# Patient Record
Sex: MALE | Race: WHITE | NOT HISPANIC OR LATINO | Employment: FULL TIME | URBAN - METROPOLITAN AREA
[De-identification: names, ages, dates, MRNs, and addresses within clinical notes are randomized per-mention and may not be internally consistent; named-entity substitution may affect disease eponyms.]

---

## 2024-03-15 ENCOUNTER — OFFICE VISIT (OUTPATIENT)
Dept: PRIMARY CARE | Facility: CLINIC | Age: 39
End: 2024-03-15
Payer: COMMERCIAL

## 2024-03-15 VITALS
SYSTOLIC BLOOD PRESSURE: 112 MMHG | RESPIRATION RATE: 16 BRPM | BODY MASS INDEX: 33.32 KG/M2 | DIASTOLIC BLOOD PRESSURE: 70 MMHG | WEIGHT: 238 LBS | HEIGHT: 71 IN | HEART RATE: 66 BPM | OXYGEN SATURATION: 96 % | TEMPERATURE: 97 F

## 2024-03-15 DIAGNOSIS — E66.9 OBESITY, CLASS I, BMI 30-34.9: ICD-10-CM

## 2024-03-15 DIAGNOSIS — E11.9 CONTROLLED TYPE 2 DIABETES MELLITUS WITHOUT COMPLICATION, WITHOUT LONG-TERM CURRENT USE OF INSULIN (MULTI): ICD-10-CM

## 2024-03-15 DIAGNOSIS — Z00.00 ROUTINE HEALTH MAINTENANCE: Primary | ICD-10-CM

## 2024-03-15 DIAGNOSIS — R40.0 DAYTIME SOMNOLENCE: ICD-10-CM

## 2024-03-15 DIAGNOSIS — R29.818 SUSPECTED SLEEP APNEA: ICD-10-CM

## 2024-03-15 DIAGNOSIS — F43.9 STRESS: ICD-10-CM

## 2024-03-15 DIAGNOSIS — E78.2 HYPERLIPIDEMIA, MIXED: ICD-10-CM

## 2024-03-15 DIAGNOSIS — R80.9 MICROALBUMINURIA: ICD-10-CM

## 2024-03-15 DIAGNOSIS — N52.9 ERECTILE DYSFUNCTION, UNSPECIFIED ERECTILE DYSFUNCTION TYPE: ICD-10-CM

## 2024-03-15 PROBLEM — R06.89 GASPING FOR BREATH: Status: RESOLVED | Noted: 2024-03-15 | Resolved: 2024-03-15

## 2024-03-15 PROBLEM — G89.29 CHRONIC PAIN OF RIGHT KNEE: Status: ACTIVE | Noted: 2024-03-15

## 2024-03-15 PROBLEM — M25.561 CHRONIC PAIN OF RIGHT KNEE: Status: ACTIVE | Noted: 2024-03-15

## 2024-03-15 PROBLEM — R06.89 GASPING FOR BREATH: Status: ACTIVE | Noted: 2024-03-15

## 2024-03-15 PROBLEM — S09.90XS HEAD TRAUMA, SEQUELA: Status: ACTIVE | Noted: 2020-01-17

## 2024-03-15 LAB — POC HEMOGLOBIN A1C: 5.3 % (ref 4.2–6.5)

## 2024-03-15 PROCEDURE — 99214 OFFICE O/P EST MOD 30 MIN: CPT | Performed by: STUDENT IN AN ORGANIZED HEALTH CARE EDUCATION/TRAINING PROGRAM

## 2024-03-15 PROCEDURE — 3078F DIAST BP <80 MM HG: CPT | Performed by: STUDENT IN AN ORGANIZED HEALTH CARE EDUCATION/TRAINING PROGRAM

## 2024-03-15 PROCEDURE — 83036 HEMOGLOBIN GLYCOSYLATED A1C: CPT | Performed by: STUDENT IN AN ORGANIZED HEALTH CARE EDUCATION/TRAINING PROGRAM

## 2024-03-15 PROCEDURE — 1036F TOBACCO NON-USER: CPT | Performed by: STUDENT IN AN ORGANIZED HEALTH CARE EDUCATION/TRAINING PROGRAM

## 2024-03-15 PROCEDURE — 3074F SYST BP LT 130 MM HG: CPT | Performed by: STUDENT IN AN ORGANIZED HEALTH CARE EDUCATION/TRAINING PROGRAM

## 2024-03-15 RX ORDER — SILDENAFIL CITRATE 20 MG/1
20-40 TABLET ORAL DAILY PRN
COMMUNITY

## 2024-03-15 ASSESSMENT — PATIENT HEALTH QUESTIONNAIRE - PHQ9
1. LITTLE INTEREST OR PLEASURE IN DOING THINGS: NOT AT ALL
SUM OF ALL RESPONSES TO PHQ9 QUESTIONS 1 & 2: 2
2. FEELING DOWN, DEPRESSED OR HOPELESS: MORE THAN HALF THE DAYS

## 2024-03-15 NOTE — PROGRESS NOTES
"Subjective   Patient ID: Gideon Armas is a 38 y.o. male who presents for Establish Care and Diabetes.  HPI    He is seen today to establish care.     Would like to follow up on diabetes. Has improved diet with vegan diet, plant based Mediterranean diet. Checks blood sugars weekly and have been in 101-115 in AM.     Still getting AM erections. Has been using sildenafil which helps ED but hasn't tried 40mg yet.     Some fatigue during day but trouble sleeping at night due to increased stress. Wife notices snoring.     Reports history of anxiety.           Review of Systems   Constitutional:  Negative for chills, diaphoresis and fever.   Respiratory:  Negative for shortness of breath.    Cardiovascular:  Negative for chest pain.   Gastrointestinal:  Negative for diarrhea, nausea and vomiting.   Endocrine: Negative for cold intolerance and heat intolerance.   Genitourinary:  Negative for dysuria.   Neurological:  Negative for dizziness and light-headedness.       /70   Pulse 66   Temp 36.1 °C (97 °F) (Temporal)   Resp 16   Ht 1.803 m (5' 11\")   Wt 108 kg (238 lb)   SpO2 96%   BMI 33.19 kg/m²     Objective   Physical Exam  Vitals reviewed.   Constitutional:       General: He is not in acute distress.     Appearance: Normal appearance.   HENT:      Head: Normocephalic.   Cardiovascular:      Rate and Rhythm: Normal rate and regular rhythm.   Pulmonary:      Effort: Pulmonary effort is normal. No respiratory distress.      Breath sounds: Normal breath sounds.   Abdominal:      General: There is no distension.   Musculoskeletal:         General: No deformity.   Skin:     Coloration: Skin is not jaundiced.   Neurological:      Mental Status: He is alert.         Assessment/Plan   Problem List Items Addressed This Visit       Controlled type 2 diabetes mellitus without complication, without long-term current use of insulin (CMS/[facility])    Relevant Orders    Albumin , Urine Random    Referral to Ophthalmology    POCT " glycosylated hemoglobin (Hb A1C) manually resulted (Completed)    Daytime somnolence    Relevant Orders    Home sleep apnea test (HSAT)    Hyperlipidemia, mixed    Microalbuminuria    Obesity, Class I, BMI 30-34.9    Relevant Orders    Home sleep apnea test (HSAT)    Suspected sleep apnea    Relevant Orders    Home sleep apnea test (HSAT)    Erectile dysfunction    Stress    Relevant Orders    Referral to Psychology     Other Visit Diagnoses       Routine health maintenance    -  Primary    Relevant Orders    Lipid Panel    CBC    Comprehensive Metabolic Panel    TSH with reflex to Free T4 if abnormal          Type 2 diabetes mellitus  Microalbuminuria  - Last A1c was 4.8, recheck today was 5.3.   -Spot urine protein abnormal January 2023, due, recheck  -Foot exam at next visit.  - Eye exam-saw last year at [facility], re referred today    Hyperlipidemia  - Last lipids in July 2023 were elevated, recheck    Elevated LFTs  - Normal LFTs January 2023, recheck  -Sleep study done for daytime somnolence    BMI 33  - Diet and exercise    Erectile dysfunction  -Has been prescribed sildenafil 20-40 mg PRN in past and aware how to use it    History of subclinical head trauma from football through college-was concerned about temper at one point. Had tremors in head about 10 years ago and had MRI and was not an issue.     Stress  -Some family issues and job stress. Dad had stroke March 1st and he is helping take care of him and he reports he will also establish care here. Relies on his wife and Aunt to help with stress and support. Aunt lives in [place]. Talking to a counselor.   -Refer to psychology to establish care.     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     Fasting labs ordered  CPE 3 months, F/u sooner PRN

## 2024-03-16 PROBLEM — F43.9 STRESS: Status: ACTIVE | Noted: 2024-03-16

## 2024-03-16 PROBLEM — N52.9 ERECTILE DYSFUNCTION: Status: ACTIVE | Noted: 2024-03-16

## 2024-03-16 ASSESSMENT — ENCOUNTER SYMPTOMS
DIARRHEA: 0
LIGHT-HEADEDNESS: 0
DIZZINESS: 0
SHORTNESS OF BREATH: 0
VOMITING: 0
FEVER: 0
NAUSEA: 0
CHILLS: 0
DIAPHORESIS: 0
DYSURIA: 0

## 2024-04-02 ENCOUNTER — CLINICAL SUPPORT (OUTPATIENT)
Dept: SLEEP MEDICINE | Facility: HOSPITAL | Age: 39
End: 2024-04-02
Payer: COMMERCIAL

## 2024-04-02 DIAGNOSIS — R29.818 SUSPECTED SLEEP APNEA: ICD-10-CM

## 2024-04-02 DIAGNOSIS — R40.0 DAYTIME SOMNOLENCE: ICD-10-CM

## 2024-04-02 DIAGNOSIS — E66.9 OBESITY, CLASS I, BMI 30-34.9: ICD-10-CM

## 2024-04-02 PROCEDURE — 95806 SLEEP STUDY UNATT&RESP EFFT: CPT | Performed by: PSYCHIATRY & NEUROLOGY

## 2024-04-22 DIAGNOSIS — G47.33 MILD OBSTRUCTIVE SLEEP APNEA: Primary | ICD-10-CM

## 2024-06-14 ENCOUNTER — LAB (OUTPATIENT)
Dept: LAB | Facility: LAB | Age: 39
End: 2024-06-14
Payer: COMMERCIAL

## 2024-06-14 ENCOUNTER — APPOINTMENT (OUTPATIENT)
Dept: PRIMARY CARE | Facility: CLINIC | Age: 39
End: 2024-06-14
Payer: COMMERCIAL

## 2024-06-14 VITALS
RESPIRATION RATE: 14 BRPM | TEMPERATURE: 97.7 F | WEIGHT: 237 LBS | HEART RATE: 63 BPM | SYSTOLIC BLOOD PRESSURE: 112 MMHG | DIASTOLIC BLOOD PRESSURE: 78 MMHG | HEIGHT: 71 IN | BODY MASS INDEX: 33.18 KG/M2 | OXYGEN SATURATION: 99 %

## 2024-06-14 DIAGNOSIS — G47.33 MILD OBSTRUCTIVE SLEEP APNEA: ICD-10-CM

## 2024-06-14 DIAGNOSIS — Z00.00 ROUTINE HEALTH MAINTENANCE: ICD-10-CM

## 2024-06-14 DIAGNOSIS — E11.9 CONTROLLED TYPE 2 DIABETES MELLITUS WITHOUT COMPLICATION, WITHOUT LONG-TERM CURRENT USE OF INSULIN (MULTI): ICD-10-CM

## 2024-06-14 DIAGNOSIS — R40.0 DAYTIME SOMNOLENCE: Primary | ICD-10-CM

## 2024-06-14 DIAGNOSIS — F32.A MILD DEPRESSION: ICD-10-CM

## 2024-06-14 DIAGNOSIS — E78.2 HYPERLIPIDEMIA, MIXED: ICD-10-CM

## 2024-06-14 DIAGNOSIS — E66.9 OBESITY, CLASS I, BMI 30-34.9: ICD-10-CM

## 2024-06-14 DIAGNOSIS — R80.9 MICROALBUMINURIA: ICD-10-CM

## 2024-06-14 DIAGNOSIS — N52.9 ERECTILE DYSFUNCTION, UNSPECIFIED ERECTILE DYSFUNCTION TYPE: ICD-10-CM

## 2024-06-14 LAB
ALBUMIN SERPL BCP-MCNC: 4.6 G/DL (ref 3.4–5)
ALP SERPL-CCNC: 67 U/L (ref 33–120)
ALT SERPL W P-5'-P-CCNC: 31 U/L (ref 10–52)
ANION GAP SERPL CALC-SCNC: 13 MMOL/L (ref 10–20)
AST SERPL W P-5'-P-CCNC: 24 U/L (ref 9–39)
BILIRUB SERPL-MCNC: 1.2 MG/DL (ref 0–1.2)
BUN SERPL-MCNC: 7 MG/DL (ref 6–23)
CALCIUM SERPL-MCNC: 9.7 MG/DL (ref 8.6–10.6)
CHLORIDE SERPL-SCNC: 101 MMOL/L (ref 98–107)
CHOLEST SERPL-MCNC: 199 MG/DL (ref 0–199)
CHOLESTEROL/HDL RATIO: 5.3
CO2 SERPL-SCNC: 30 MMOL/L (ref 21–32)
CREAT SERPL-MCNC: 0.94 MG/DL (ref 0.5–1.3)
CREAT UR-MCNC: 104.8 MG/DL (ref 20–370)
EGFRCR SERPLBLD CKD-EPI 2021: >90 ML/MIN/1.73M*2
ERYTHROCYTE [DISTWIDTH] IN BLOOD BY AUTOMATED COUNT: 12.5 % (ref 11.5–14.5)
GLUCOSE SERPL-MCNC: 94 MG/DL (ref 74–99)
HCT VFR BLD AUTO: 45.6 % (ref 41–52)
HDLC SERPL-MCNC: 37.2 MG/DL
HGB BLD-MCNC: 15.9 G/DL (ref 13.5–17.5)
LDLC SERPL CALC-MCNC: 112 MG/DL
MCH RBC QN AUTO: 29.8 PG (ref 26–34)
MCHC RBC AUTO-ENTMCNC: 34.9 G/DL (ref 32–36)
MCV RBC AUTO: 85 FL (ref 80–100)
MICROALBUMIN UR-MCNC: 7.1 MG/L
MICROALBUMIN/CREAT UR: 6.8 UG/MG CREAT
NON HDL CHOLESTEROL: 162 MG/DL (ref 0–149)
NRBC BLD-RTO: 0 /100 WBCS (ref 0–0)
PLATELET # BLD AUTO: 217 X10*3/UL (ref 150–450)
POC HEMOGLOBIN A1C: 5.2 % (ref 4.2–6.5)
POTASSIUM SERPL-SCNC: 3.9 MMOL/L (ref 3.5–5.3)
PROT SERPL-MCNC: 7.2 G/DL (ref 6.4–8.2)
RBC # BLD AUTO: 5.34 X10*6/UL (ref 4.5–5.9)
SODIUM SERPL-SCNC: 140 MMOL/L (ref 136–145)
TRIGL SERPL-MCNC: 251 MG/DL (ref 0–149)
TSH SERPL-ACNC: 1.62 MIU/L (ref 0.44–3.98)
VLDL: 50 MG/DL (ref 0–40)
WBC # BLD AUTO: 5 X10*3/UL (ref 4.4–11.3)

## 2024-06-14 PROCEDURE — 82043 UR ALBUMIN QUANTITATIVE: CPT

## 2024-06-14 PROCEDURE — 82570 ASSAY OF URINE CREATININE: CPT

## 2024-06-14 PROCEDURE — 85027 COMPLETE CBC AUTOMATED: CPT

## 2024-06-14 PROCEDURE — 80061 LIPID PANEL: CPT

## 2024-06-14 PROCEDURE — 36415 COLL VENOUS BLD VENIPUNCTURE: CPT

## 2024-06-14 PROCEDURE — 80053 COMPREHEN METABOLIC PANEL: CPT

## 2024-06-14 PROCEDURE — 84443 ASSAY THYROID STIM HORMONE: CPT

## 2024-06-14 ASSESSMENT — PATIENT HEALTH QUESTIONNAIRE - PHQ9
5. POOR APPETITE OR OVEREATING: SEVERAL DAYS
SUM OF ALL RESPONSES TO PHQ9 QUESTIONS 1 AND 2: 2
4. FEELING TIRED OR HAVING LITTLE ENERGY: NOT AT ALL
SUM OF ALL RESPONSES TO PHQ QUESTIONS 1-9: 8
7. TROUBLE CONCENTRATING ON THINGS, SUCH AS READING THE NEWSPAPER OR WATCHING TELEVISION: SEVERAL DAYS
10. IF YOU CHECKED OFF ANY PROBLEMS, HOW DIFFICULT HAVE THESE PROBLEMS MADE IT FOR YOU TO DO YOUR WORK, TAKE CARE OF THINGS AT HOME, OR GET ALONG WITH OTHER PEOPLE: SOMEWHAT DIFFICULT
9. THOUGHTS THAT YOU WOULD BE BETTER OFF DEAD, OR OF HURTING YOURSELF: NOT AT ALL
1. LITTLE INTEREST OR PLEASURE IN DOING THINGS: SEVERAL DAYS
2. FEELING DOWN, DEPRESSED OR HOPELESS: SEVERAL DAYS
6. FEELING BAD ABOUT YOURSELF - OR THAT YOU ARE A FAILURE OR HAVE LET YOURSELF OR YOUR FAMILY DOWN: MORE THAN HALF THE DAYS
3. TROUBLE FALLING OR STAYING ASLEEP: MORE THAN HALF THE DAYS
8. MOVING OR SPEAKING SO SLOWLY THAT OTHER PEOPLE COULD HAVE NOTICED. OR THE OPPOSITE, BEING SO FIGETY OR RESTLESS THAT YOU HAVE BEEN MOVING AROUND A LOT MORE THAN USUAL: NOT AT ALL

## 2024-06-14 ASSESSMENT — ENCOUNTER SYMPTOMS
MYALGIAS: 0
UNEXPECTED WEIGHT CHANGE: 0
LIGHT-HEADEDNESS: 0
DIAPHORESIS: 0
VOMITING: 0
NAUSEA: 0
DIZZINESS: 0
DIARRHEA: 0
SHORTNESS OF BREATH: 0
FEVER: 0
DYSURIA: 0
CHILLS: 0

## 2024-06-14 NOTE — PROGRESS NOTES
"Subjective   Patient ID: Gideon Armas is a 38 y.o. male who presents for Annual Exam.  HPI    Here for CPE.    Feels diet has been more poor as of recent and some weight gain.    Some stress with helping with his father who had a stroke and living at a nursing facility.    Meeting with his therapist quite regularly.    On completing PHQ-9 reports no thoughts of self-harm in the last 2 weeks though does have intermittent thoughts which are fleeting of self-harm.  Denies any plan to act on these thoughts whatsoever and is motivated to keep living to help his father.  He has told these thoughts to his therapist who helps him navigate these closely.        Review of Systems   Constitutional:  Negative for chills, diaphoresis, fever and unexpected weight change.   HENT:  Negative for hearing loss.    Eyes:  Negative for visual disturbance.   Respiratory:  Negative for shortness of breath.    Cardiovascular:  Negative for chest pain.   Gastrointestinal:  Negative for diarrhea, nausea and vomiting.   Endocrine: Negative for cold intolerance and heat intolerance.   Genitourinary:  Negative for dysuria, scrotal swelling and testicular pain.   Musculoskeletal:  Negative for myalgias.   Skin:  Negative for rash.   Neurological:  Negative for dizziness and light-headedness.       /78   Pulse 63   Temp 36.5 °C (97.7 °F) (Temporal)   Resp 14   Ht 1.803 m (5' 11\")   Wt 108 kg (237 lb)   SpO2 99%   BMI 33.05 kg/m²     Objective   Physical Exam  Vitals reviewed.   Constitutional:       General: He is not in acute distress.     Appearance: Normal appearance.   HENT:      Head: Normocephalic.      Right Ear: Tympanic membrane, ear canal and external ear normal. There is no impacted cerumen.      Left Ear: Tympanic membrane, ear canal and external ear normal. There is no impacted cerumen.      Mouth/Throat:      Mouth: Mucous membranes are moist.      Pharynx: Oropharynx is clear. No oropharyngeal exudate or posterior " oropharyngeal erythema.   Cardiovascular:      Rate and Rhythm: Normal rate and regular rhythm.   Pulmonary:      Effort: Pulmonary effort is normal. No respiratory distress.      Breath sounds: Normal breath sounds.   Abdominal:      General: Bowel sounds are normal. There is no distension.      Palpations: Abdomen is soft. There is no mass.      Tenderness: There is no abdominal tenderness. There is no guarding or rebound.   Musculoskeletal:         General: No deformity.      Cervical back: Neck supple. No tenderness.        Feet:    Feet:      Comments: Intact monofilament testing to areas marked in blue.  Lymphadenopathy:      Cervical: No cervical adenopathy.   Skin:     Coloration: Skin is not jaundiced.   Neurological:      Mental Status: He is alert.         Assessment/Plan   Problem List Items Addressed This Visit       Controlled type 2 diabetes mellitus without complication, without long-term current use of insulin ([facility])    Relevant Orders    POCT glycosylated hemoglobin (Hb A1C) manually resulted (Completed)    Daytime somnolence - Primary    Hyperlipidemia, mixed    Microalbuminuria    Obesity, Class I, BMI 30-34.9    Erectile dysfunction    Mild depression    Mild obstructive sleep apnea   Type 2 diabetes mellitus  Microalbuminuria  - Last A1c was 4.8, recheck today was 5.3.   -Spot urine protein abnormal January 2023, due, recheck  -Foot exam normal 6/14/24  - Eye exam-saw last year at [facility], has appt scheduled.   -Recheck urine for microalbuminuria.  Explained that he will very likely need to be restarted on ACE/ARB to preserve kidney function     Hyperlipidemia  - Last lipids in July 2023 were elevated, recheck     Elevated LFTs  - Normal LFTs January 2023, recheck    Mild AMPARO  -Advised to schedule with sleep medicine    BMI 33  - Diet and exercise     Erectile dysfunction  -Has been prescribed sildenafil 20-40 mg PRN in past and aware how to use it     History of subclinical head trauma from  football through college-was concerned about temper at one point. Had tremors in head about 10 years ago and had MRI and ultimately was not an issue.     Stress  -Some family issues and job stress. Dad had stroke March 1st and he is helping take care of him and he reports he will also establish care here. Relies on his wife and Aunt to help with stress and support. Aunt lives in [place]. Talking to a counselor.     Depression  -PHQ-9 score of 8 consistent with mild depression.  -Although he has had fleeting thoughts of self-harm which are very short-lived and associated with his anxiety he has never had any thoughts to act on those and follows very closely with his therapist.  Additionally he is motivated to help his father get better with his stroke and has a good support system.  I do not believe him to be suicidal.  Provided information to call suicide and crisis hotline at [phone] or call 911 for emergent evaluation ER if needed.  -Happy to see sooner than 3 months to discuss mood, he will set up follow-up if needed.     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      Health Maintenance  -Prostate Cancer Screening: Age 50  -Vaccinations: Advised covid booster, flu after labor day. TDAP done 2020, due 2030. Reports UTD childhood vaccines.   -Lung Cancer Screening: Not indicated  -AAA Screening:Not indicated  -Colonoscopy:Age 45  -Screen for HIV/Hep C: Negative 10/2021    CPE completed.  Advised to keep a heart healthy, low fat  diet with fruits and veggies like Mediterranean diet.  Advised on the importance of exercise and maintaining 150 minutes of exercise per week (30 minutes per day 5 days a week).  Advised on regular eye and dental visits.  Discussed age appropriate cancer screening, immunizations and recommendations given.  Discussed avoiding illicit drugs and tobacco. Advised on appropriate use of alcohol.  Advised to wear seat belt.    Plan to follow-up 3  months, sooner if needed.  CPE 1 year

## 2024-06-15 PROBLEM — G47.33 MILD OBSTRUCTIVE SLEEP APNEA: Status: ACTIVE | Noted: 2024-06-15

## 2024-06-15 PROBLEM — F32.A MILD DEPRESSION: Status: ACTIVE | Noted: 2024-06-15

## 2024-08-02 ENCOUNTER — APPOINTMENT (OUTPATIENT)
Dept: OPHTHALMOLOGY | Facility: CLINIC | Age: 39
End: 2024-08-02
Payer: COMMERCIAL

## 2024-08-14 ENCOUNTER — TELEMEDICINE (OUTPATIENT)
Dept: PRIMARY CARE | Facility: CLINIC | Age: 39
End: 2024-08-14
Payer: COMMERCIAL

## 2024-08-14 DIAGNOSIS — U07.1 COVID: Primary | ICD-10-CM

## 2024-08-14 PROCEDURE — 3061F NEG MICROALBUMINURIA REV: CPT | Performed by: NURSE PRACTITIONER

## 2024-08-14 PROCEDURE — 3049F LDL-C 100-129 MG/DL: CPT | Performed by: NURSE PRACTITIONER

## 2024-08-14 PROCEDURE — 99213 OFFICE O/P EST LOW 20 MIN: CPT | Performed by: NURSE PRACTITIONER

## 2024-08-14 ASSESSMENT — ENCOUNTER SYMPTOMS: FLU SYMPTOMS: 1

## 2024-08-14 NOTE — PROGRESS NOTES
Subjective   Patient ID: Gideon Armas is a 38 y.o. male who presents for Flu Symptoms (Onset about 5-6 days ago. Current symptoms include diarrhea, congestion and abdominal cramping. /Fever and headache have resolved. ).    Patient of Dr. Keller.    [place] resident and had out of town travel last week. He drove near [place] to do some laundry and then flew to [place] and returned yesterday for various travel.  Friday he thought he had food poisoning; was feeling fever, chills, diarrhea.  Nasal passage is congested.   Lasted through Saturday. Had associated stomach cramps and pain so he stopped taking Imodium.  Since then it has just been diarrhea. Diarrhea is not as frequent.   He went for a COVID test today and it was positive.   Father recently had a stroke and is in the hospital now since September.      Flu Symptoms       Review of Systems  otherwise negative aside from what was mentioned above in HPI.    Objective   There were no vitals taken for this visit.    Physical Exam  Constitutional:       Appearance: Normal appearance. He is ill-appearing.   Neurological:      Mental Status: He is alert.     This visit was completed via telephone/virtual visit. All issues as documented below were discussed and addressed but no physical exam was performed. If it was felt that the patient should be evaluated via  face-to-face office appointment(s) they were directed there. The patient verbally consented to this visit.      Assessment/Plan   Problem List Items Addressed This Visit    None  Visit Diagnoses         Codes    COVID    -  Primary U07.1          Plan:  -Drink plenty of fluids and get plenty of rest. If you develop SOB, chest pain, dizziness, or severe headache call for follow up or go directly to ER.     -Monitor your health and practice social distancing. Social distancing been staying out of crowded places, avoiding group gatherings, and maintaining distance (approximately 6 feet) from others when possible  for 5 days followed by 5 days of strict masking around others..    -If you get sick with fever (100.4°F/38°C or higher), cough, or have trouble breathing; call our office back or call the emergency room. Discussed the importance of calling ahead to prevent the spread of infection to others.    -Avoid contact with others.    -Do not travel while sick.    -Wash her hands often with soap and water for at least 20 seconds. If soap and water are not readily available, and use an alcohol-based hand  that contains at least 60% alcohol. Soap and water should be used if hands are visibly dirty.

## 2024-08-19 ENCOUNTER — TELEPHONE (OUTPATIENT)
Dept: PRIMARY CARE | Facility: CLINIC | Age: 39
End: 2024-08-19
Payer: COMMERCIAL

## 2024-08-19 NOTE — TELEPHONE ENCOUNTER
Pt of yours. Sent a [employer] appt request stating the following: I had a virtual appt last week. I thought I was getting better. However, last night I had a fever, and have not had a normal BM for 9 days. Upon calling the pt to offer a virtual with Dr Coyne he refused and stated he wanted to be seen by Dr Keller in person. Pt did not physically take his temp and still feels ill. States he saw Abigail for a virtual and tested positive on 8/14. Asking for an in office visit. Please advise. Thank you!

## 2024-09-13 ENCOUNTER — APPOINTMENT (OUTPATIENT)
Dept: PRIMARY CARE | Facility: CLINIC | Age: 39
End: 2024-09-13
Payer: COMMERCIAL

## 2024-11-15 ENCOUNTER — APPOINTMENT (OUTPATIENT)
Dept: OPHTHALMOLOGY | Facility: CLINIC | Age: 39
End: 2024-11-15
Payer: COMMERCIAL

## 2024-12-13 ENCOUNTER — APPOINTMENT (OUTPATIENT)
Dept: PRIMARY CARE | Facility: CLINIC | Age: 39
End: 2024-12-13
Payer: COMMERCIAL